# Patient Record
Sex: MALE | Race: WHITE | NOT HISPANIC OR LATINO | URBAN - METROPOLITAN AREA
[De-identification: names, ages, dates, MRNs, and addresses within clinical notes are randomized per-mention and may not be internally consistent; named-entity substitution may affect disease eponyms.]

---

## 2019-09-19 ENCOUNTER — NON-PROVIDER VISIT (OUTPATIENT)
Dept: NEUROLOGY | Facility: MEDICAL CENTER | Age: 59
End: 2019-09-19
Payer: COMMERCIAL

## 2019-09-19 DIAGNOSIS — G56.02 CARPAL TUNNEL SYNDROME ON LEFT: ICD-10-CM

## 2019-09-19 PROCEDURE — 95886 MUSC TEST DONE W/N TEST COMP: CPT | Performed by: PSYCHIATRY & NEUROLOGY

## 2019-09-19 PROCEDURE — 95908 NRV CNDJ TST 3-4 STUDIES: CPT | Performed by: PSYCHIATRY & NEUROLOGY

## 2019-09-19 NOTE — PROCEDURES
"NERVE CONDUCTION STUDIES AND ELECTROMYOGRAPHY REPORT  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  There is no evidence of left cervical radiculopathy.  Recommend clinical correlation.    Ade Avila MD  Neurology - Neurophysiology  [facility]      REASON FOR REFERRAL:  Mr. Patrick Jarrett 58 y.o. referred by Dr. Kalia Nogueira for evaluation of numbness and weakness affecting the left hand.  Symptoms have been ongoing for at least 5 years.  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    Height: 5'10\"  Weight: 200 lbs      ELECTRODIAGNOSTIC EXAMINATION:  Nerve conduction studies (NCS) and electromyography (EMG) are utilized to evaluate direct or indirect damage to the peripheral nervous system. NCS are performed to measure the nerve(s) response(s) to electrostimulation across a given nerve segment. EMG evaluates the passive and active electrical activity of the muscle(s) in question.  Muscles are innervated by specific peripheral nerves and roots. Often times, several nerves the muscle to be examined in order to determine the presence or absence of the disease process. Furthermore, nerves and muscles may need to be tested in a [id number] comparison, as well as in additional extremities, as this may be crucial in characterizing the extent of the disease process, which may be diffuse or isolated and of varying degree of severity. The extent of the neurodiagnostic exam is justified as it may help arrive to a proper diagnosis, which ultimately may contribute to better management of the patient. Therefore, the nerves to muscles examined during the study were medically necessary.    Unless otherwise noted, temperature of the extremity(s) study was " monitored before and during the examination and remained between 32 and 36 degrees C for the upper extremities, and between 30 and 36 degrees C for the lower extremities. The patient tolerated testing well, without any complications.       NERVE CONDUCTION STUDY SUMMARY:  Selected nerves of the left upper extremity are studied.    Abnormal left median sensory response due to severely prolonged latency.  Normal left median motor response due to prolonged distal latency and severely decreased amplitude.  Motor response with proximal stimulation is unobtainable.  Normal left ulnar sensory and motor responses.       NEEDLE EMG SUMMARY:  Concentric needle study of selected left upper extremity muscles is performed.     Insertion activity is increased in the left abductor pollicis brevis due to 2+ fibrillation potentials and positive waves.  There is no motor activity appreciated in the left APB.  Otherwise with activation, there are normal morphology (amplitude/duration) motor unit action potentials firing with normal recruitment muscles tested.       PATIENT DATA TABLES  Nerve Conduction Studies     Stim Site NR Onset (ms) Norm Onset (ms) O-P Amp (mV) Norm O-P Amp Site1 Site2 Delta-0 (ms) Dist (cm) Mejia (m/s) Norm Mejia (m/s)   Left Median Motor (Abd Poll Brev)  35°C    atrophy   Wrist    *6.2 <4 *0.1 >6 Elbow Wrist  25.0  >50   Elbow *NR             Left Ulnar Motor (Abd Dig Min)  35°C   Wrist    3.0 <3.1 10.8 >7 B Elbow Wrist 4.0 23.5 59 >50   B Elbow    7.0  10.5  A Elbow B Elbow 1.4 10.0 71    A Elbow    8.4  10.5               Stim Site NR Peak (ms) Norm Peak (ms) P-T Amp (µV) Site1 Site2 Delta-P (ms) Norm Delta (ms)   Left Median/Ulnar Palm Comparison (Wrist - 8cm)  35°C   Median Palm    *6.4 <2.3 4.6 Median Palm Ulnar Palm *4.3 <0.3   Ulnar Palm    2.1 <2.3 9.3                Electromyography     Side Muscle Nerve Root Ins Act Fibs Psw Amp Dur Poly Recrt Int Pat Comment   Left 1stDorInt Ulnar C8-T1 Nml Nml Nml Nml  Nml 0 Nml Nml    Left PronatorTeres Median C6-7 Nml Nml Nml Nml Nml 0 Nml Nml    Left Biceps Musculocut C5-6 Nml Nml Nml Nml Nml 0 Nml Nml    Left Triceps Radial C6-7-8 Nml Nml Nml Nml Nml 0 Nml Nml    Left Deltoid Axillary C5-6 Nml Nml Nml Nml Nml 0 Nml Nml    Left Abd Poll Brev Median C8-T1 *Incr *2+ *2+    No activity